# Patient Record
Sex: FEMALE | Race: WHITE | Employment: FULL TIME | URBAN - METROPOLITAN AREA
[De-identification: names, ages, dates, MRNs, and addresses within clinical notes are randomized per-mention and may not be internally consistent; named-entity substitution may affect disease eponyms.]

---
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
## 2020-10-05 ENCOUNTER — HOSPITAL ENCOUNTER (OUTPATIENT)
Age: 20
Discharge: HOME | End: 2020-10-05
Payer: COMMERCIAL

## 2020-10-05 VITALS
SYSTOLIC BLOOD PRESSURE: 135 MMHG | HEART RATE: 72 BPM | DIASTOLIC BLOOD PRESSURE: 68 MMHG | OXYGEN SATURATION: 100 % | TEMPERATURE: 98 F | RESPIRATION RATE: 20 BRPM

## 2020-10-05 DIAGNOSIS — R09.81 NASAL CONGESTION: Primary | ICD-10-CM

## 2020-10-05 PROCEDURE — 99203 OFFICE O/P NEW LOW 30 MIN: CPT

## 2020-10-05 PROCEDURE — 99213 OFFICE O/P EST LOW 20 MIN: CPT

## 2020-10-06 NOTE — ED INITIAL ASSESSMENT (HPI)
3 days of congestion and cough. No fever/chills. Denies chest pain or SOB. Feeling better today. Requests testing.

## 2020-10-06 NOTE — ED PROVIDER NOTES
Patient Seen in: [facility]      History   Patient presents with:  Cough/URI    Stated Complaint: covid test    HPI    24 yo female with no PMH here for evaluation of congestion, cough. Symptoms started 3 days ago.   She Effort: Pulmonary effort is normal.   Abdominal:      General: Abdomen is flat. Musculoskeletal: Normal range of motion. Skin:     General: Skin is warm. Neurological:      General: No focal deficit present.       Mental Status: She is alert and orien

## 2020-12-06 ENCOUNTER — HOSPITAL ENCOUNTER (OUTPATIENT)
Age: 20
Discharge: HOME | End: 2020-12-06
Payer: COMMERCIAL

## 2020-12-06 VITALS
OXYGEN SATURATION: 100 % | RESPIRATION RATE: 16 BRPM | SYSTOLIC BLOOD PRESSURE: 132 MMHG | HEART RATE: 82 BPM | DIASTOLIC BLOOD PRESSURE: 65 MMHG | TEMPERATURE: 98 F

## 2020-12-06 DIAGNOSIS — Z20.822 ENCOUNTER FOR LABORATORY TESTING FOR COVID-19 VIRUS: Primary | ICD-10-CM

## 2020-12-06 PROCEDURE — 99213 OFFICE O/P EST LOW 20 MIN: CPT

## 2020-12-06 PROCEDURE — 99212 OFFICE O/P EST SF 10 MIN: CPT

## 2020-12-06 NOTE — ED PROVIDER NOTES
Patient presents with:  Testing      HPI:     Patrice Suggs is a 23year old female who presents for a Covid test.  She had a possible exposure at work. She denies any symptoms or complaints. She appears well and nontoxic.       [address] [facility] [address] Relationship status: Not on file      Intimate partner violence        Fear of current or ex partner: Not on file        Emotionally abused: Not on file        Physically abused: Not on file        Forced sexual activity: Not on file    Other Topics      C your condition today.     Follow Up with:  Dat Velasquez MD  [facility] [phone]    Schedule an appointment as soon as possible for a visit   As needed

## 2020-12-06 NOTE — ED INITIAL ASSESSMENT (HPI)
PATIENT ARRIVED AMBULATORY TO ROOM FOR COVID TESTING. PATIENT STATES SHE WAS EXPOSED BY A CO WORKER LAST WEEK. NO SYMPTOMS.

## 2020-12-20 ENCOUNTER — HOSPITAL ENCOUNTER (OUTPATIENT)
Age: 20
Discharge: HOME | End: 2020-12-20
Attending: EMERGENCY MEDICINE
Payer: COMMERCIAL

## 2020-12-20 VITALS
BODY MASS INDEX: 17 KG/M2 | RESPIRATION RATE: 18 BRPM | TEMPERATURE: 98 F | OXYGEN SATURATION: 100 % | HEART RATE: 96 BPM | WEIGHT: 115 LBS | DIASTOLIC BLOOD PRESSURE: 73 MMHG | SYSTOLIC BLOOD PRESSURE: 123 MMHG

## 2020-12-20 DIAGNOSIS — R31.9 URINARY TRACT INFECTION WITH HEMATURIA, SITE UNSPECIFIED: Primary | ICD-10-CM

## 2020-12-20 DIAGNOSIS — N39.0 URINARY TRACT INFECTION WITH HEMATURIA, SITE UNSPECIFIED: Primary | ICD-10-CM

## 2020-12-20 PROCEDURE — 99213 OFFICE O/P EST LOW 20 MIN: CPT

## 2020-12-20 PROCEDURE — 81025 URINE PREGNANCY TEST: CPT

## 2020-12-20 PROCEDURE — 99214 OFFICE O/P EST MOD 30 MIN: CPT

## 2020-12-20 PROCEDURE — 81002 URINALYSIS NONAUTO W/O SCOPE: CPT

## 2020-12-20 RX ORDER — SULFAMETHOXAZOLE AND TRIMETHOPRIM 800; 160 MG/1; MG/1
1 TABLET ORAL 2 TIMES DAILY
Qty: 6 TABLET | Refills: 0 | Status: SHIPPED | OUTPATIENT
Start: 2020-12-20 | End: 2020-12-23

## 2020-12-20 RX ORDER — PHENAZOPYRIDINE HYDROCHLORIDE 200 MG/1
200 TABLET, FILM COATED ORAL 3 TIMES DAILY PRN
Qty: 6 TABLET | Refills: 0 | Status: SHIPPED | OUTPATIENT
Start: 2020-12-20 | End: 2020-12-22

## 2020-12-20 NOTE — ED INITIAL ASSESSMENT (HPI)
PATIENT ARRIVED AMBULATORY TO ROOM C/O URINARY SYMPTOMS THAT STARTED 3 DAYS AGO. +URINARY FREQUENCY/URGENCY. +HEMATURIA YESTERDAY. NO ABDOMINAL PAIN. NO BACK PAIN. NO FEVERS. NO N/V/D.

## 2020-12-20 NOTE — ED PROVIDER NOTES
Patient Seen in: [facility]      History   Patient presents with:  Urinary Symptoms    Stated Complaint: uti    HPI    24 yo female with three days of urinary frequency, urgency and dysuria. Has had some blood in the urine. No fever.  No back Abdominal:      General: Abdomen is flat. There is no distension. Tenderness: There is no abdominal tenderness. There is no right CVA tenderness or left CVA tenderness. Skin:     General: Skin is warm and dry.       Capillary Refill: Capillary refi

## 2021-03-12 ENCOUNTER — APPOINTMENT (OUTPATIENT)
Dept: GENERAL RADIOLOGY | Age: 21
End: 2021-03-12
Attending: EMERGENCY MEDICINE
Payer: COMMERCIAL

## 2021-03-12 ENCOUNTER — HOSPITAL ENCOUNTER (OUTPATIENT)
Age: 21
Discharge: HOME | End: 2021-03-12
Attending: EMERGENCY MEDICINE
Payer: COMMERCIAL

## 2021-03-12 VITALS
RESPIRATION RATE: 18 BRPM | SYSTOLIC BLOOD PRESSURE: 130 MMHG | OXYGEN SATURATION: 99 % | HEART RATE: 75 BPM | TEMPERATURE: 98 F | DIASTOLIC BLOOD PRESSURE: 87 MMHG

## 2021-03-12 DIAGNOSIS — S50.12XA CONTUSION OF LEFT ELBOW AND FOREARM, INITIAL ENCOUNTER: Primary | ICD-10-CM

## 2021-03-12 DIAGNOSIS — N30.00 ACUTE CYSTITIS WITHOUT HEMATURIA: ICD-10-CM

## 2021-03-12 LAB
B-HCG UR QL: NEGATIVE
BILIRUB UR QL STRIP: NEGATIVE
CLARITY UR: CLEAR
COLOR UR: YELLOW
GLUCOSE UR STRIP-MCNC: NEGATIVE MG/DL
KETONES UR STRIP-MCNC: NEGATIVE MG/DL
NITRITE UR QL STRIP: NEGATIVE
PH UR STRIP: 7 [PH]
PROT UR STRIP-MCNC: NEGATIVE MG/DL
SP GR UR STRIP: 1.01
UROBILINOGEN UR STRIP-ACNC: <2 MG/DL

## 2021-03-12 PROCEDURE — 81002 URINALYSIS NONAUTO W/O SCOPE: CPT

## 2021-03-12 PROCEDURE — 99214 OFFICE O/P EST MOD 30 MIN: CPT

## 2021-03-12 PROCEDURE — 73080 X-RAY EXAM OF ELBOW: CPT | Performed by: EMERGENCY MEDICINE

## 2021-03-12 PROCEDURE — 73130 X-RAY EXAM OF HAND: CPT | Performed by: EMERGENCY MEDICINE

## 2021-03-12 PROCEDURE — 73090 X-RAY EXAM OF FOREARM: CPT | Performed by: EMERGENCY MEDICINE

## 2021-03-12 PROCEDURE — 81025 URINE PREGNANCY TEST: CPT

## 2021-03-12 PROCEDURE — 87086 URINE CULTURE/COLONY COUNT: CPT | Performed by: EMERGENCY MEDICINE

## 2021-03-12 RX ORDER — CEPHALEXIN 500 MG/1
500 CAPSULE ORAL 3 TIMES DAILY
Qty: 15 CAPSULE | Refills: 0 | Status: SHIPPED | OUTPATIENT
Start: 2021-03-12 | End: 2021-03-17

## 2021-03-13 NOTE — ED PROVIDER NOTES
Patient Seen in: [facility]      History   Patient presents with:  Fall    Stated Complaint: left elbow and arm pain    HPI/Subjective:   HPI    The patient is a 56-year-old female with past history of UTI who presents now with multiple medic (Exact Date)   SpO2 99%         Physical Exam    Constitutional: Well-developed well-nourished in no acute distress  Head: Normocephalic, no swelling or tenderness  Eyes: Nonicteric sclera, no conjunctival injection  ENT: TMs are clear and flat bilaterally days  If symptoms worsen          Medications Prescribed:  Discharge Medication List as of 3/12/2021  7:36 PM    START taking these medications    cephALEXin 500 MG Oral Cap  Take 1 capsule (500 mg total) by mouth 3 (three) times daily for 5 days. , Normal,

## 2021-03-13 NOTE — ED INITIAL ASSESSMENT (HPI)
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

## 2021-04-12 ENCOUNTER — HOSPITAL ENCOUNTER (OUTPATIENT)
Age: 21
Discharge: HOME | End: 2021-04-12
Attending: EMERGENCY MEDICINE
Payer: COMMERCIAL

## 2021-04-12 ENCOUNTER — APPOINTMENT (OUTPATIENT)
Dept: GENERAL RADIOLOGY | Age: 21
End: 2021-04-12
Attending: EMERGENCY MEDICINE
Payer: COMMERCIAL

## 2021-04-12 VITALS
SYSTOLIC BLOOD PRESSURE: 133 MMHG | TEMPERATURE: 98 F | RESPIRATION RATE: 18 BRPM | OXYGEN SATURATION: 99 % | DIASTOLIC BLOOD PRESSURE: 81 MMHG | HEART RATE: 73 BPM

## 2021-04-12 DIAGNOSIS — S16.1XXA STRAIN OF NECK MUSCLE, INITIAL ENCOUNTER: Primary | ICD-10-CM

## 2021-04-12 PROCEDURE — 99213 OFFICE O/P EST LOW 20 MIN: CPT

## 2021-04-12 PROCEDURE — 72040 X-RAY EXAM NECK SPINE 2-3 VW: CPT | Performed by: EMERGENCY MEDICINE

## 2021-04-12 RX ORDER — CYCLOBENZAPRINE HCL 10 MG
5 TABLET ORAL 3 TIMES DAILY PRN
Qty: 20 TABLET | Refills: 0 | Status: SHIPPED | OUTPATIENT
Start: 2021-04-12 | End: 2021-04-19

## 2021-04-12 NOTE — ED INITIAL ASSESSMENT (HPI)
PATIENT ARRIVED AMBULATORY TO ROOM C/O LEFT SIDED NECK PAIN THAT STARTED THIS MORNING AT 0600. PATIENT STATES \"I WAS FINE WHEN I WOKE UP. I GOT TO WORK AND I GOT THIS INTENSE PAIN IN THE LEFT SIDE OF MY NECK\" PAIN WITH MOVEMENT. NO FEVERS. NO INJURY.

## 2021-04-12 NOTE — ED PROVIDER NOTES
Patient Seen in: [facility]      History   Patient presents with:  Neck Pain    Stated Complaint: neck sprain    HPI/Subjective:   HPI    The patient is a 50-year-old female with no significant past medical history who presents now with neck [04/12/21 0829]   /81   Pulse 73   Resp 18   Temp 97.5 °F (36.4 °C)   Temp src    SpO2 99 %   O2 Device None (Room air)       Current:/81   Pulse 73   Temp 97.5 °F (36.4 °C)   Resp 18   LMP 03/23/2021 (Exact Date)   SpO2 99%         Physical Ex worsen          Medications Prescribed:  Current Discharge Medication List    START taking these medications    cyclobenzaprine 10 MG Oral Tab  Take 0.5 tablets (5 mg total) by mouth 3 (three) times daily as needed for Muscle spasms.   Qty: 20 tablet Refill

## 2021-08-24 ENCOUNTER — HOSPITAL ENCOUNTER (OUTPATIENT)
Age: 21
Discharge: HOME | End: 2021-08-24
Attending: EMERGENCY MEDICINE
Payer: OTHER MISCELLANEOUS

## 2021-08-24 VITALS
RESPIRATION RATE: 18 BRPM | OXYGEN SATURATION: 97 % | SYSTOLIC BLOOD PRESSURE: 124 MMHG | TEMPERATURE: 98 F | DIASTOLIC BLOOD PRESSURE: 61 MMHG | HEART RATE: 58 BPM

## 2021-08-24 DIAGNOSIS — S60.511A ABRASION OF SKIN OF RIGHT HAND: Primary | ICD-10-CM

## 2021-08-24 PROCEDURE — 99212 OFFICE O/P EST SF 10 MIN: CPT

## 2021-08-24 NOTE — ED PROVIDER NOTES
Patient Seen in: [facility]      History   Patient presents with:   Worker's Comp  Laceration    Stated Complaint: workers comp - hand laceration    HPI/Subjective:   HPI    The patient is a 23-year-old female with no significant past medical and symmetric in the upper and lower extremities bilaterally  Extremities: No focal swelling or tenderness  Skin: Small, nonfull-thickness laceration approximately 1 cm proximal to the right second MCP joint.       ED Course   Labs Reviewed - No data to dis

## 2021-08-24 NOTE — ED INITIAL ASSESSMENT (HPI)
Patient with laceration to right hand, underneath the second digit.  States while at work afternoon she attempted to clean behind a refrigerator when her hand got scratched by metal.  tdap 2013 per emr

## 2022-06-02 ENCOUNTER — HOSPITAL ENCOUNTER (OUTPATIENT)
Age: 22
Discharge: HOME | End: 2022-06-02
Payer: COMMERCIAL

## 2022-06-02 VITALS
OXYGEN SATURATION: 100 % | HEART RATE: 82 BPM | TEMPERATURE: 98 F | HEIGHT: 70.08 IN | DIASTOLIC BLOOD PRESSURE: 56 MMHG | SYSTOLIC BLOOD PRESSURE: 112 MMHG | WEIGHT: 162 LBS | BODY MASS INDEX: 23.19 KG/M2 | RESPIRATION RATE: 20 BRPM

## 2022-06-02 DIAGNOSIS — U07.1 COVID-19 VIRUS INFECTION: Primary | ICD-10-CM

## 2022-06-02 DIAGNOSIS — Z20.822 ENCOUNTER FOR SCREENING LABORATORY TESTING FOR COVID-19 VIRUS: ICD-10-CM

## 2022-06-02 LAB — SARS-COV-2 RNA RESP QL NAA+PROBE: DETECTED

## 2022-06-02 PROCEDURE — 99213 OFFICE O/P EST LOW 20 MIN: CPT

## 2022-06-02 PROCEDURE — 99212 OFFICE O/P EST SF 10 MIN: CPT

## 2022-06-02 NOTE — ED INITIAL ASSESSMENT (HPI)
Has had covid symptoms since . Kurt 12 + home covid test yesterday, here for confirmation testing and a work note

## 2022-10-25 ENCOUNTER — HOSPITAL ENCOUNTER (OUTPATIENT)
Age: 22
Discharge: HOME | End: 2022-10-25
Payer: COMMERCIAL

## 2022-10-25 VITALS
TEMPERATURE: 98 F | RESPIRATION RATE: 20 BRPM | DIASTOLIC BLOOD PRESSURE: 68 MMHG | SYSTOLIC BLOOD PRESSURE: 133 MMHG | OXYGEN SATURATION: 100 % | HEART RATE: 79 BPM

## 2022-10-25 DIAGNOSIS — N30.01 ACUTE CYSTITIS WITH HEMATURIA: Primary | ICD-10-CM

## 2022-10-25 LAB
B-HCG UR QL: NEGATIVE
BILIRUB UR QL STRIP: NEGATIVE
COLOR UR: YELLOW
GLUCOSE UR STRIP-MCNC: NEGATIVE MG/DL
KETONES UR STRIP-MCNC: NEGATIVE MG/DL
NITRITE UR QL STRIP: NEGATIVE
PH UR STRIP: 5.5 [PH]
PROT UR STRIP-MCNC: NEGATIVE MG/DL
SP GR UR STRIP: <=1.005
UROBILINOGEN UR STRIP-ACNC: <2 MG/DL

## 2022-10-25 PROCEDURE — 81025 URINE PREGNANCY TEST: CPT

## 2022-10-25 PROCEDURE — 99213 OFFICE O/P EST LOW 20 MIN: CPT

## 2022-10-25 PROCEDURE — 81002 URINALYSIS NONAUTO W/O SCOPE: CPT

## 2022-10-25 PROCEDURE — 99214 OFFICE O/P EST MOD 30 MIN: CPT

## 2022-10-25 RX ORDER — NITROFURANTOIN 25; 75 MG/1; MG/1
100 CAPSULE ORAL 2 TIMES DAILY
Qty: 10 CAPSULE | Refills: 0 | Status: SHIPPED | OUTPATIENT
Start: 2022-10-25 | End: 2022-10-30

## 2022-10-25 NOTE — ED INITIAL ASSESSMENT (HPI)
Patient with dysuria starting yesterday. + urgency and hematuria as well. Had extra macrobid at home and took two doses.

## 2023-06-13 ENCOUNTER — APPOINTMENT (OUTPATIENT)
Dept: GENERAL RADIOLOGY | Age: 23
End: 2023-06-13
Attending: EMERGENCY MEDICINE
Payer: COMMERCIAL

## 2023-06-13 ENCOUNTER — HOSPITAL ENCOUNTER (OUTPATIENT)
Age: 23
Discharge: HOME | End: 2023-06-13
Attending: EMERGENCY MEDICINE
Payer: COMMERCIAL

## 2023-06-13 VITALS
HEART RATE: 63 BPM | DIASTOLIC BLOOD PRESSURE: 63 MMHG | SYSTOLIC BLOOD PRESSURE: 128 MMHG | OXYGEN SATURATION: 100 % | TEMPERATURE: 98 F | RESPIRATION RATE: 17 BRPM

## 2023-06-13 DIAGNOSIS — S39.012A STRAIN OF LUMBAR REGION, INITIAL ENCOUNTER: Primary | ICD-10-CM

## 2023-06-13 LAB
B-HCG UR QL: NEGATIVE
BILIRUB UR QL STRIP: NEGATIVE
CLARITY UR: CLEAR
COLOR UR: YELLOW
GLUCOSE UR STRIP-MCNC: NEGATIVE MG/DL
KETONES UR STRIP-MCNC: NEGATIVE MG/DL
NITRITE UR QL STRIP: NEGATIVE
PH UR STRIP: 6 [PH]
PROT UR STRIP-MCNC: NEGATIVE MG/DL
SP GR UR STRIP: 1.02
UROBILINOGEN UR STRIP-ACNC: <2 MG/DL

## 2023-06-13 PROCEDURE — 81025 URINE PREGNANCY TEST: CPT

## 2023-06-13 PROCEDURE — 87086 URINE CULTURE/COLONY COUNT: CPT | Performed by: EMERGENCY MEDICINE

## 2023-06-13 PROCEDURE — 72100 X-RAY EXAM L-S SPINE 2/3 VWS: CPT | Performed by: EMERGENCY MEDICINE

## 2023-06-13 PROCEDURE — 99214 OFFICE O/P EST MOD 30 MIN: CPT

## 2023-06-13 PROCEDURE — 81002 URINALYSIS NONAUTO W/O SCOPE: CPT

## 2023-06-13 RX ORDER — CYCLOBENZAPRINE HCL 5 MG
5 TABLET ORAL 3 TIMES DAILY PRN
Qty: 21 TABLET | Refills: 0 | Status: SHIPPED | OUTPATIENT
Start: 2023-06-13

## 2023-06-13 RX ORDER — METHYLPREDNISOLONE 4 MG/1
TABLET ORAL
Qty: 1 EACH | Refills: 0 | Status: SHIPPED | OUTPATIENT
Start: 2023-06-13

## 2023-06-13 NOTE — ED INITIAL ASSESSMENT (HPI)
Pt lifted something two weeks ago and developed lumbar pain .  Stated this morning bent over to grab something and pain increased to lumbar region

## 2024-04-13 ENCOUNTER — HOSPITAL ENCOUNTER (OUTPATIENT)
Age: 24
Discharge: HOME | End: 2024-04-13
Attending: EMERGENCY MEDICINE
Payer: COMMERCIAL

## 2024-04-13 VITALS
TEMPERATURE: 98 F | SYSTOLIC BLOOD PRESSURE: 114 MMHG | DIASTOLIC BLOOD PRESSURE: 60 MMHG | RESPIRATION RATE: 18 BRPM | OXYGEN SATURATION: 99 % | HEART RATE: 82 BPM

## 2024-04-13 DIAGNOSIS — R10.13 EPIGASTRIC PAIN: Primary | ICD-10-CM

## 2024-04-13 PROCEDURE — 99212 OFFICE O/P EST SF 10 MIN: CPT

## 2024-04-13 PROCEDURE — 99213 OFFICE O/P EST LOW 20 MIN: CPT

## 2024-04-13 NOTE — ED INITIAL ASSESSMENT (HPI)
Onset of epigastric pain last night after eating a hot dog with sport peppers. Pain comes in waves and woke her from sleep during the night. No n/v/d. Some heartburn and tightness to chest.

## 2024-04-13 NOTE — ED PROVIDER NOTES
Patient Seen in: [facility]      History     Chief Complaint   Patient presents with    Epigastric Pain     Stated Complaint: Abdominal Pain    Subjective:   HPI    23 yo female with epigastric pain that started yesterday while at work. Pain was more intense last night. She had more bowel movements than usual but not diarrhea. Not bloody. No vomiting. Epigastric pain is still present today but is better. Has had recent URI symptoms with sinus drainage/post nasal drip.   Drinks alcohol but has not had a drink in two weeks at least.     Objective:   Past Medical History:    Acne    Anxiety              Past Surgical History:   Procedure Laterality Date    Adenoidectomy      Tonsillectomy      [place] teeth removed                  Social History     Socioeconomic History    Marital status: Single   Tobacco Use    Smoking status: Never    Smokeless tobacco: Never   Vaping Use    Vaping status: Every Day    Substances: Nicotine, Flavoring    Devices: Disposable, Pre-filled pod   Substance and Sexual Activity    Alcohol use: Yes     Alcohol/week: 0.0 - 3.0 standard drinks of alcohol     Comment: weekly    Drug use: Yes     Types: Cannabis     Comment: 2 to 3 times monthly    Sexual activity: Yes     Partners: Male   Social History Narrative    Single     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  Constitutional and vital signs reviewed.      All other systems reviewed and negative except as noted above.    Physical Exam     ED Triage Vitals [04/13/24 1048]   /60   Pulse 82   Resp 18   Temp 97.8 °F (36.6 °C)   Temp src Temporal   SpO2 99 %   O2 Device None (Room air)       Current:/60   Pulse 82   Temp 97.8 °F (36.6 °C) (Temporal)   Resp 18   LMP 03/15/2024 (Approximate)   SpO2 99%          Physical Exam  Vitals and nursing note reviewed.   Constitutional:       Appearance: Normal appearance. She is well-developed.   HENT:      Head: Normocephalic and atraumatic.   Cardiovascular:      Rate and Rhythm: Normal rate and regular rhythm.   Pulmonary:      Effort: Pulmonary effort is normal. No respiratory distress.   Abdominal:      General: There is no distension.      Palpations: Abdomen is soft.      Tenderness: There is abdominal tenderness (minimal epigastric).   Skin:     General: Skin is warm and dry.      Capillary Refill: Capillary refill takes less than 2 seconds.   Neurological:      General: No focal deficit present.      Mental Status: She is alert.      Sensory: No sensory deficit.   Psychiatric:         Mood and Affect: Mood normal.         Behavior: Behavior normal.              ED Course   Labs Reviewed - No data to display                   MDM                                        Medical Decision Making    GERD, ulcer, pancreatitis all in differential. History and physicial most consistent with GERD. Recommend otc antacid and dietary modifications for the next week. Add flonase for sinus drainage. If not improving follow up with primary care.   Disposition and Plan     Clinical Impression:  1. Epigastric pain         Disposition:  Discharge  4/13/2024 11:06 am    Follow-up:  Claudia Burton MD  [address] [place] [address]  [address]  [facility] [id number] [phone]    In 1 week  As needed          Medications Prescribed:  Current Discharge Medication List

## 2024-06-01 ENCOUNTER — HOSPITAL ENCOUNTER (OUTPATIENT)
Age: 24
Discharge: HOME | End: 2024-06-01
Payer: COMMERCIAL

## 2024-06-01 VITALS
OXYGEN SATURATION: 98 % | RESPIRATION RATE: 16 BRPM | HEART RATE: 62 BPM | TEMPERATURE: 98 F | SYSTOLIC BLOOD PRESSURE: 100 MMHG | DIASTOLIC BLOOD PRESSURE: 59 MMHG

## 2024-06-01 DIAGNOSIS — J02.9 VIRAL PHARYNGITIS: Primary | ICD-10-CM

## 2024-06-01 LAB — S PYO AG THROAT QL IA.RAPID: NEGATIVE

## 2024-06-01 PROCEDURE — 99213 OFFICE O/P EST LOW 20 MIN: CPT

## 2024-06-01 PROCEDURE — 99212 OFFICE O/P EST SF 10 MIN: CPT

## 2024-06-01 PROCEDURE — 87651 STREP A DNA AMP PROBE: CPT | Performed by: NURSE PRACTITIONER

## 2024-06-01 NOTE — ED PROVIDER NOTES
Patient Seen in: [facility]      History     Chief Complaint   Patient presents with    Sore Throat     Stated Complaint: Sore Throat    Subjective:   HPI    24-year-old female presents to immediate care with complaints of sore throat x 3 days.  She was most concerned for strep throat.  She is in no acute distress and has been afebrile.    Objective:   Past Medical History:    Acne    Anxiety              Past Surgical History:   Procedure Laterality Date    Adenoidectomy      Tonsillectomy      [place] teeth removed                  No pertinent social history.            Review of Systems    Positive for stated complaint: Sore Throat  Other systems are as noted in HPI.  Constitutional and vital signs reviewed.      All other systems reviewed and negative except as noted above.    Physical Exam     ED Triage Vitals [06/01/24 1016]   /59   Pulse 62   Resp 16   Temp 97.9 °F (36.6 °C)   Temp src Temporal   SpO2 98 %   O2 Device None (Room air)       Current Vitals:   Vital Signs  BP: 100/59  Pulse: 62  Resp: 16  Temp: 97.9 °F (36.6 °C)  Temp src: Temporal    Oxygen Therapy  SpO2: 98 %  O2 Device: None (Room air)            Physical Exam  Vitals reviewed.   Constitutional:       General: She is not in acute distress.     Appearance: She is not ill-appearing.   HENT:      Right Ear: Tympanic membrane and ear canal normal.      Left Ear: Tympanic membrane and ear canal normal.      Mouth/Throat:      Mouth: Mucous membranes are moist. No oral lesions.      Pharynx: Uvula midline. No pharyngeal swelling, oropharyngeal exudate, posterior oropharyngeal erythema or uvula swelling.      Tonsils: No tonsillar exudate or tonsillar abscesses.   Cardiovascular:      Rate and Rhythm: Normal rate and regular rhythm.   Pulmonary:      Effort: Pulmonary effort is normal.      Breath sounds: Normal breath sounds.   Musculoskeletal:      Cervical back: Normal range of motion and neck supple.   Lymphadenopathy:       Cervical: No cervical adenopathy.   Skin:     General: Skin is warm and dry.   Neurological:      General: No focal deficit present.      Mental Status: She is alert and oriented to person, place, and time.   Psychiatric:         Mood and Affect: Mood normal.         Behavior: Behavior normal.               ED Course     Labs Reviewed   RAPID STREP A - Normal                      MDM                                         Medical Decision Making  24-year-old female with sore throat.  Differential diagnosis includes viral pharyngitis versus strep pharyngitis.  POC strep is negative.  Results were discussed with patient.  She was given some instructions for care of symptom relief.    Amount and/or Complexity of Data Reviewed  Labs: ordered.     Details: POC strep is negative        Disposition and Plan     Clinical Impression:  1. Viral pharyngitis         Disposition:  Discharge  6/1/2024 10:29 am    Follow-up:  Claudia Burton MD  [address] [place] [address]  [facility] [address]  [phone]      If symptoms worsen          Medications Prescribed:  Discharge Medication List as of 6/1/2024 10:30 AM

## 2024-08-17 ENCOUNTER — OFFICE VISIT (OUTPATIENT)
Facility: CLINIC | Age: 24
End: 2024-08-17
Payer: COMMERCIAL

## 2024-08-17 VITALS
HEART RATE: 109 BPM | BODY MASS INDEX: 25.08 KG/M2 | HEIGHT: 70 IN | DIASTOLIC BLOOD PRESSURE: 70 MMHG | SYSTOLIC BLOOD PRESSURE: 120 MMHG | WEIGHT: 175.19 LBS

## 2024-08-17 DIAGNOSIS — Z01.419 WELL WOMAN EXAM WITH ROUTINE GYNECOLOGICAL EXAM: ICD-10-CM

## 2024-08-17 DIAGNOSIS — Z12.4 PAPANICOLAOU SMEAR FOR CERVICAL CANCER SCREENING: Primary | ICD-10-CM

## 2024-08-17 DIAGNOSIS — Z30.09 GENERAL COUNSELING FOR PRESCRIPTION OF ORAL CONTRACEPTIVES: ICD-10-CM

## 2024-08-17 DIAGNOSIS — N92.6 IRREGULAR MENSES: ICD-10-CM

## 2024-08-17 PROCEDURE — 3008F BODY MASS INDEX DOCD: CPT | Performed by: OBSTETRICS & GYNECOLOGY

## 2024-08-17 PROCEDURE — 3078F DIAST BP <80 MM HG: CPT | Performed by: OBSTETRICS & GYNECOLOGY

## 2024-08-17 PROCEDURE — 90651 9VHPV VACCINE 2/3 DOSE IM: CPT | Performed by: OBSTETRICS & GYNECOLOGY

## 2024-08-17 PROCEDURE — 99395 PREV VISIT EST AGE 18-39: CPT | Performed by: OBSTETRICS & GYNECOLOGY

## 2024-08-17 PROCEDURE — 88175 CYTOPATH C/V AUTO FLUID REDO: CPT | Performed by: OBSTETRICS & GYNECOLOGY

## 2024-08-17 PROCEDURE — 87624 HPV HI-RISK TYP POOLED RSLT: CPT | Performed by: OBSTETRICS & GYNECOLOGY

## 2024-08-17 PROCEDURE — 3074F SYST BP LT 130 MM HG: CPT | Performed by: OBSTETRICS & GYNECOLOGY

## 2024-08-17 PROCEDURE — 90471 IMMUNIZATION ADMIN: CPT | Performed by: OBSTETRICS & GYNECOLOGY

## 2024-08-17 RX ORDER — DROSPIRENONE AND ETHINYL ESTRADIOL 0.03MG-3MG
1 KIT ORAL DAILY
Qty: 84 TABLET | Refills: 5 | Status: SHIPPED | OUTPATIENT
Start: 2024-08-17

## 2024-08-17 NOTE — PROGRESS NOTES
Adali Alberts is a 24 year old female  Patient's last menstrual period was 2024 (approximate).   Chief Complaint   Patient presents with    Contraception     Patient would like to discuss Birth Control options  -Patient reports she stop taking OCP in July, Not interested in IUD's    Wellness Visit     Annual Exam   .     She has a history of irregular.  She was diagnosed with PCOS in the past.  She does not have any discharge from her nipples she does not have any hirsutism she was on the birth control pill for regulating her periods and also for birth control.  Condoms were discussed.  Options for birth control were discussed and she wishes to go back on the pill.  No contraindications to the pill.    Her grandmother had breast cancer and her father has tested positive for one of the BR CA genes testing was offered to her.    She occasionally will get a UTI after intercourse was on antibiotics and saw urologist in the past.  Will try d-mannose first.  If that does not work we will do Macrodantin 50  with intercourse    She has not had the Gardiselle vaccinations and wishes to start them    OBSTETRICS HISTORY:  OB History    Para Term  AB Living   0 0 0 0 0 0   SAB IAB Ectopic Multiple Live Births   0 0 0 0 0       GYNE HISTORY:  Periods irregular light    History   Sexual Activity    Sexual activity: Not Currently    Partners: Male        Hx Prior Abnormal Pap: No  Pap Date: 20  Pap Result Notes: Negative        MEDICAL HISTORY:  Past Medical History:    Acne    Anxiety       SURGICAL HISTORY:  Past Surgical History:   Procedure Laterality Date    Adenoidectomy      Other surgical history      [place] teeth removal    Tonsillectomy      [place] teeth removed         SOCIAL HISTORY:  Social History     Socioeconomic History    Marital status: Single     Spouse name: Not on file    Number of children: Not on file    Years of education: Not on file    Highest education level: Not on file    Occupational History    Not on file   Tobacco Use    Smoking status: Never    Smokeless tobacco: Never   Vaping Use    Vaping status: Every Day    Substances: Nicotine, Flavoring    Devices: Disposable, Pre-filled pod   Substance and Sexual Activity    Alcohol use: Yes     Alcohol/week: 0.0 - 3.0 standard drinks of alcohol     Comment: weekly    Drug use: Yes     Types: Cannabis     Comment: 2 to 3 times monthly    Sexual activity: Not Currently     Partners: Male   Other Topics Concern    Caffeine Concern No    Exercise No    Seat Belt No    Special Diet No    Stress Concern No    Weight Concern No   Social History Narrative    Single     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         Social Determinants of Health     Financial Resource Strain: Not on file   Food Insecurity: Not on file   Transportation Needs: Not on file   Physical Activity: Not on file   Stress: Not on file   Social Connections: Not on file   Housing Stability: At Risk (8/18/2023)    Received from [facility]     Living Situation: Not on file     Housing Problems: Not on file       FAMILY HISTORY:  Family History   Problem Relation Age of Onset    Cancer Paternal Grandmother         Breast cancer    Diabetes Paternal Grandfather     Cancer Paternal Grandfather         Thyroid Cancer    Heart Disease Maternal Grandfather     Renal Disease Paternal Aunt         ovarian or uterine cancer    Cancer Paternal Aunt         uterine cancer    No Known Problems Mother     No Known Problems Father        MEDICATIONS:    Current Outpatient Medications:     escitalopram 10 MG Oral Tab, Take 1 tablet (10 mg total) by mouth daily., Disp: 90 tablet, Rfl: 1    methylPREDNISolone (MEDROL) 4 MG Oral Tablet Therapy Pack, Dosepack: take as directed (Patient not taking: Reported on 8/17/2024), Disp: 1 each, Rfl: 0    cyclobenzaprine 5 MG Oral Tab, Take 1 tablet (5 mg total) by mouth 3 (three) times daily as needed for Muscle spasms.  (Patient not taking: Reported on 8/17/2024), Disp: 21 tablet, Rfl: 0    NITROFURANTOIN MONOHYDRATE MACRO 100 MG Oral Cap, TAKE 1 CAPSULE (100 MG TOTAL) BY MOUTH AS NEEDED. APPOINTMENT REQUIRED FOR ADDITIONAL REFILLS (Patient not taking: Reported on 8/17/2024), Disp: 30 capsule, Rfl: 0    drospirenone-ethinyl estradiol 3-0.03 MG Oral Tab, Take 1 tablet by mouth daily. (Patient not taking: Reported on 8/17/2024), Disp: 84 tablet, Rfl: 4    Multiple Vitamins-Minerals (WOMENS MULTI VITAMIN & MINERAL) Oral Tab, Take by mouth daily. (Patient not taking: Reported on 8/17/2024), Disp: , Rfl:     Acidophilus/Pectin Oral Cap, Take 1 capsule by mouth daily. (Patient not taking: Reported on 8/17/2024), Disp: , Rfl:     ALLERGIES:  No Known Allergies      Review of Systems:  Constitutional:  Denies fatigue, night sweats, hot flashes  Gastrointestinal:  denies heartburn, abdominal pain, diarrhea or constipation  Genitourinary:  denies dysuria, incontinence, abnormal vaginal discharge, vaginal itching  Skin/Breast:  Denies any breast pain, lumps, or discharge.   Neurological:  denies headaches, extremity weakness or numbness.      PHYSICAL EXAM:   Constitutional: well developed, well nourished  Head/Face: normocephalic  Neck/Thyroid: thyroid symmetric, no thyromegaly, no nodules, no adenopathy  Breast: normal without palpable masses, tenderness, asymmetry, nipple discharge, nipple retraction or skin changes  Abdomen:  soft, nontender, nondistended, no masses  Skin/Hair: no unusual rashes or bruises   Extremities: no edema, no cyanosis  Psychiatric:  Oriented to time, place, person and situation. Appropriate mood and affect    Pelvic Exam:  External Genitalia: normal appearance, hair distribution, and no lesions  Urethral Meatus:  normal in size, location, without lesions and prolapse  Bladder:  No fullness, masses or tenderness  Vagina:  Normal appearance without lesions, no abnormal discharge  Cervix:  Normal without tenderness  on motion without lesions Pap.  Uterus: normal in size, contour, position, mobility, without tenderness  Adnexa: normal without masses or tenderness  Perineum: normal  Anus: no hemorroids     Assessment & Plan:  There are no diagnoses linked to this encounter.    Well woman exam.  Irregular periods.  History of PCOS.  Father positive for a BRCA gene desire for Gardiselle.  History of UTIs after intercourse

## 2024-08-22 LAB
.: NORMAL
.: NORMAL
HPV E6+E7 MRNA CVX QL NAA+PROBE: NEGATIVE

## (undated) NOTE — LETTER
Date & Time: 6/13/2023, 10:58 AM  Patient: Nicole Burger  Encounter Provider(s):    Fernando Mejia MD       To Whom It May Concern:    Aaron Mckeon was seen and treated in our department on 6/13/2023. She should not return to work until 6/15/2023 .     If you have any questions or concerns, please do not hesitate to call.        _____________________________  Physician/APC Signature

## (undated) NOTE — LETTER
Date & Time: 8/24/2021, 2:47 PM  Patient: Inez Rice  Encounter Provider(s):    James King MD       To Whom It May Concern:    Grace Hernandez was seen and treated in our department on 8/24/2021.  She can return to work with these limitations: Must

## (undated) NOTE — LETTER
Date & Time: 6/2/2022, 4:22 PM  Patient: Felecia Erickson  Encounter Provider(s):    RUBEN Gipson       To Whom It May Concern:    Amira Jimenez was seen and treated in our department on 6/2/2022. She should not return to work until [employer] with social isolation per [employer]. If you have any questions or concerns, please do not hesitate to call.       STEVAN Carroll  _____________________________  [id number] Signature

## (undated) NOTE — LETTER
Date & Time: 3/12/2021, 7:34 PM  Patient: Sunshine Do  Encounter Provider(s):    Ha Valiente MD       To Whom It May Concern:    Fracnie Dinh was seen and treated in our department on 3/12/2021.  She can return to work with these limitations: Must